# Patient Record
Sex: FEMALE | Race: WHITE | NOT HISPANIC OR LATINO | Employment: FULL TIME | URBAN - NONMETROPOLITAN AREA
[De-identification: names, ages, dates, MRNs, and addresses within clinical notes are randomized per-mention and may not be internally consistent; named-entity substitution may affect disease eponyms.]

---

## 2023-09-01 ENCOUNTER — OFFICE VISIT (OUTPATIENT)
Dept: URGENT CARE | Facility: CLINIC | Age: 43
End: 2023-09-01
Payer: COMMERCIAL

## 2023-09-01 VITALS
SYSTOLIC BLOOD PRESSURE: 142 MMHG | TEMPERATURE: 97.8 F | HEART RATE: 89 BPM | DIASTOLIC BLOOD PRESSURE: 96 MMHG | OXYGEN SATURATION: 94 % | RESPIRATION RATE: 18 BRPM | WEIGHT: 248 LBS

## 2023-09-01 DIAGNOSIS — J02.9 SORE THROAT: Primary | ICD-10-CM

## 2023-09-01 DIAGNOSIS — R53.83 OTHER FATIGUE: ICD-10-CM

## 2023-09-01 DIAGNOSIS — R05.1 ACUTE COUGH: ICD-10-CM

## 2023-09-01 DIAGNOSIS — I88.9 SUBMANDIBULAR LYMPHADENITIS: ICD-10-CM

## 2023-09-01 LAB — S PYO AG THROAT QL: NEGATIVE

## 2023-09-01 PROCEDURE — 87880 STREP A ASSAY W/OPTIC: CPT

## 2023-09-01 PROCEDURE — G0382 LEV 3 HOSP TYPE B ED VISIT: HCPCS

## 2023-09-01 PROCEDURE — 87070 CULTURE OTHR SPECIMN AEROBIC: CPT

## 2023-09-01 RX ORDER — ASCORBIC ACID 500 MG
500 TABLET ORAL DAILY
COMMUNITY

## 2023-09-01 RX ORDER — PREDNISOLONE SODIUM PHOSPHATE 15 MG/5ML
50 SOLUTION ORAL DAILY
Qty: 83.5 ML | Refills: 0 | Status: SHIPPED | OUTPATIENT
Start: 2023-09-01 | End: 2023-09-06

## 2023-09-01 RX ORDER — AMOXICILLIN 400 MG/5ML
875 POWDER, FOR SUSPENSION ORAL 2 TIMES DAILY
Qty: 152.6 ML | Refills: 0 | Status: SHIPPED | OUTPATIENT
Start: 2023-09-01 | End: 2023-09-08

## 2023-09-01 RX ORDER — MONTELUKAST SODIUM 10 MG/1
10 TABLET ORAL
COMMUNITY
Start: 2023-07-17

## 2023-09-01 RX ORDER — ALBUTEROL SULFATE 90 UG/1
2 AEROSOL, METERED RESPIRATORY (INHALATION) EVERY 6 HOURS PRN
COMMUNITY

## 2023-09-01 NOTE — PROGRESS NOTES
[facility]        NAME: Sae Buchanan is a 40 y.o. female  : 1980    MRN: [id number]  DATE: 2023  TIME: 8:00 PM    Assessment and Plan   Sore throat [J02.9]  1. Sore throat  POCT rapid strepA    Throat culture      2. Acute cough  amoxicillin (AMOXIL) 400 MG/5ML suspension    prednisoLONE (ORAPRED) 15 mg/5 mL oral solution      3. Other fatigue        4. Submandibular lymphadenitis  amoxicillin (AMOXIL) 400 MG/5ML suspension    prednisoLONE (ORAPRED) 15 mg/5 mL oral solution        3+ tonsils bilaterally with mild erythema. PT states that she has chronic tonsillar hypertrophy. Rapid strep negative, sending for throat culture. Starting on amoxicillin and prednisone. PT requested suspension for both. Given advice for home remedies. Advised follow-up family doctor. Advised to go the ER symptoms worsen. Patient Instructions     Take prescribed medication as instructed. Take antibiotic with food avoid upset stomach. Daily probiotic supplement. Yogurt with active and live cultures. Tylenol or ibuprofen for pain or fever. Avoid ibuprofen while taking steroids. May take ibuprofen after completion of steroids. Continue with previously prescribed Tessalon Perles for cough. May try warm tea with honey, teaspoon of honey, throat lozenges, will warm salt gargles for sore throat symptoms. Continue with inhalers as previously prescribed. If no improvement in symptoms, follow-up with your family doctor or ENT. If symptoms worsen, go to the emergency room. Follow up with PCP in 3-5 days. Proceed to  ER if symptoms worsen. Chief Complaint     Chief Complaint   Patient presents with   • Fatigue   • Cough   • Headache   • Sore Throat     Onset of symptoms 5 days ago worse today tried otc meds without relief          History of Present Illness       60-year-old female presents with 5 days of fatigue, cough, sore throat, headache that began 5 days ago.   PT had a negative COVID test on the first day of symptoms. PT states she has pain with swallowing and tenderness under her jaw/neck area. PT has tried some over-the-counter medications without significant relief. PT was not diagnosed with asthma, however has albuterol and Singulair inhalers at home. She has not been using these more than usual and does not feel short of breath. PT took left over [employer] with relief of cough. PT states that her cough has been more dry than initially. Admits to slight fever at the beginning of onset but denies any recently. Denies any earache, chest pain, shortness of breath, abdominal pain, nausea, vomiting, diarrhea. History of chronic tonsillar enlargement. Has seen ENT in the past.  PT is concerned today because she has strep throat and COVID last year that required hospitalization. Review of Systems   Review of Systems   Constitutional: Positive for fatigue and fever. HENT: Positive for sore throat. Negative for congestion, ear discharge, ear pain, postnasal drip, rhinorrhea, sinus pressure and sinus pain. Eyes: Negative. Respiratory: Positive for cough. Cardiovascular: Negative. Gastrointestinal: Negative. Genitourinary: Negative. Musculoskeletal: Negative. Skin: Negative. Neurological: Positive for headaches.          Current Medications       Current Outpatient Medications:   •  albuterol (PROVENTIL HFA,VENTOLIN HFA) 90 mcg/act inhaler, Inhale 2 puffs every 6 (six) hours as needed for wheezing, Disp: , Rfl:   •  amoxicillin (AMOXIL) 400 MG/5ML suspension, Take 10.9 mL (875 mg total) by mouth 2 (two) times a day for 7 days, Disp: 152.6 mL, Rfl: 0  •  montelukast (SINGULAIR) 10 mg tablet, Take 10 mg by mouth, Disp: , Rfl:   •  prednisoLONE (ORAPRED) 15 mg/5 mL oral solution, Take 16.7 mL (50 mg total) by mouth daily for 5 days, Disp: 83.5 mL, Rfl: 0  •  ascorbic acid (VITAMIN C) 500 MG tablet, Take 500 mg by mouth daily, Disp: , Rfl:   •  cyanocobalamin (VITAMIN B-12) 1000 MCG tablet, Take 1,000 mcg by mouth daily, Disp: , Rfl:     Current Allergies     Allergies as of 09/01/2023   • (No Known Allergies)            The following portions of the patient's history were reviewed and updated as appropriate: allergies, current medications, past family history, past medical history, past social history, past surgical history and problem list.     Past Medical History:   Diagnosis Date   • Asthma        No past surgical history on file. No family history on file. Medications have been verified. Objective   /96   Pulse 89   Temp 97.8 °F (36.6 °C)   Resp 18   Wt 112 kg (248 lb)   SpO2 94%        Physical Exam     Physical Exam  Constitutional:       General: She is not in acute distress. Appearance: Normal appearance. She is not ill-appearing. HENT:      Head: Normocephalic and atraumatic. Right Ear: Tympanic membrane, ear canal and external ear normal.      Left Ear: Tympanic membrane, ear canal and external ear normal.      Nose: Nose normal.      Mouth/Throat:      Lips: Pink. Mouth: Mucous membranes are moist.      Pharynx: Oropharynx is clear. Uvula midline. Posterior oropharyngeal erythema present. No pharyngeal swelling, oropharyngeal exudate or uvula swelling. Tonsils: No tonsillar exudate or tonsillar abscesses. 3+ on the right. 3+ on the left. Comments: Chronic tonsillar enlargement bilaterally per pt  Eyes:      Extraocular Movements: Extraocular movements intact. Conjunctiva/sclera: Conjunctivae normal.      Pupils: Pupils are equal, round, and reactive to light. Cardiovascular:      Rate and Rhythm: Normal rate and regular rhythm. Pulses: Normal pulses. Heart sounds: Normal heart sounds. Pulmonary:      Effort: Pulmonary effort is normal. No respiratory distress. Breath sounds: Normal breath sounds. No stridor. No wheezing, rhonchi or rales. Chest:      Chest wall: No tenderness. Musculoskeletal:      Cervical back: Normal range of motion and neck supple. No tenderness. Lymphadenopathy:      Cervical: Cervical adenopathy (Submandibular bilaterally with tenderness.) present. Skin:     General: Skin is warm and dry. Capillary Refill: Capillary refill takes less than 2 seconds. Findings: No rash. Neurological:      General: No focal deficit present. Mental Status: She is alert and oriented to person, place, and time. Mental status is at baseline.    Psychiatric:         Mood and Affect: Mood normal.         Behavior: Behavior normal.

## 2023-09-01 NOTE — PATIENT INSTRUCTIONS
Take prescribed medication as instructed. Take antibiotic with food avoid upset stomach. Daily probiotic supplement. Yogurt with active and live cultures. Tylenol or ibuprofen for pain or fever. Avoid ibuprofen while taking steroids. May take ibuprofen after completion of steroids. Continue with previously prescribed Tessalon Perles for cough. May try warm tea with honey, teaspoon of honey, throat lozenges, will warm salt gargles for sore throat symptoms. Continue with inhalers as previously prescribed. If no improvement in symptoms, follow-up with your family doctor or ENT. If symptoms worsen, go to the emergency room. Follow up with PCP in 3-5 days. Proceed to  ER if symptoms worsen. Fatigue   WHAT YOU NEED TO KNOW:   Fatigue is mental and physical exhaustion that does not get better with rest. Fatigue may make daily activities difficult or cause extreme sleepiness. It is normal to feel tired sometimes, but long-term fatigue may be a sign of serious illness. DISCHARGE INSTRUCTIONS:   Return to the emergency department if:   You have chest pain. You have difficulty breathing. Contact your healthcare provider if:   You have a cough that gets worse, or does not go away. You see blood in your urine or bowel movement. You have numbness or tingling around your mouth or in an arm or leg. You faint, feel dizzy, or have vision changes. You have swelling in your lymph nodes. You are a woman and have vaginal bleeding that is not normal for you, or is not expected. You lose weight without trying, or you have trouble eating. You feel weak or have muscle pain. You have pain or swelling in your joints. You have questions or concerns about your condition or care. Follow up with your healthcare provider as directed: You may need more tests.  Your healthcare provider may also refer you to a specialist. Write down your questions so you remember to ask them during your visits. Manage fatigue:   Keep a fatigue diary. Include anything that makes you feel more tired or less tired. Bring the diary with you to follow-up visits with your provider. Exercise as directed. Exercise can help you feel more alert. Exercise can also help you manage stress or relieve depression. Try to get at least 30 minutes of exercise most days of the week. Keep a regular sleep schedule. Go to bed and wake up at the same times every day. Limit naps to 1 hour each day. A nap can improve fatigue, but a long nap may make it harder to go to sleep at night. Plan and limit your activities. Limit the number of activities such as shopping and cleaning you do each day. If possible, try to spread out your trips throughout the week. Plan ahead so you are not rushing to get something done. Only do activities that you have the energy to complete. Take breaks between activities. Ask for help if you need it. Another person may be able to drive you or help with daily activities. Eat a variety of healthy foods. Healthy foods include fruits, vegetables, whole-grain breads, low-fat dairy products, beans, lean meats, and fish. Good nutrition can help manage fatigue. Limit caffeine and alcohol. These can make it difficult to fall or stay asleep. Women should limit alcohol to 1 drink a day. Men should limit alcohol to 2 drinks a day. A drink of alcohol is 12 ounces of beer, 5 ounces of wine, or 1½ ounces of liquor. Ask our healthcare provider how much caffeine is safe for you. Do not smoke. Nicotine and other chemicals in cigarettes and cigars can cause lung damage and increase fatigue. Ask your healthcare provider for information if you currently smoke and need help to quit. E-cigarettes or smokeless tobacco still contain nicotine. Talk to your healthcare provider before you use these products.     © Copyright [employer] 2022 Information is for End User's use only and may not be sold, redistributed or otherwise used for commercial purposes. The above information is an  only. It is not intended as medical advice for individual conditions or treatments. Talk to your doctor, nurse or pharmacist before following any medical regimen to see if it is safe and effective for you. Adenitis   WHAT YOU NEED TO KNOW:   Adenitis is a condition that causes your lymph nodes to become swollen and tender You may also have a fever. Adenitis is a sign of infection usually caused by bacteria. DISCHARGE INSTRUCTIONS:   Call your local emergency number (911 in the [address]) if:   You have trouble breathing or swallowing. Return to the emergency department if:   You have new or worsening redness or swelling. You develop a large, soft bump that may leak pus. Call your doctor if:   Your symptoms do not improve after 10 days of treatment. You have questions or concerns about your condition or care. Medicines: You may  need any of the following:  Antibiotics  help treat a bacterial infection. Acetaminophen  decreases pain and fever. It is available without a doctor's order. Ask how much to take and how often to take it. Follow directions. Read the labels of all other medicines you are using to see if they also contain acetaminophen, or ask your doctor or pharmacist. Acetaminophen can cause liver damage if not taken correctly. NSAIDs , such as ibuprofen, help decrease swelling, pain, and fever. NSAIDs can cause stomach bleeding or kidney problems in certain people. If you take blood thinner medicine, always ask your healthcare provider if NSAIDs are safe for you. Always read the medicine label and follow directions. Take your medicine as directed. Contact your healthcare provider if you think your medicine is not helping or if you have side effects. Tell your provider if you are allergic to any medicine. Keep a list of the medicines, vitamins, and herbs you take.  Include the amounts, and when and why you take them. Bring the list or the pill bottles to follow-up visits. Carry your medicine list with you in case of an emergency. Manage your symptoms:   Apply moist heat  on your swollen lymph nodes for 20 to 30 minutes every 2 hours or as directed. Heat helps decrease pain and swelling. You can make a moist heat pack by soaking a small towel in hot water. Let it cool until you can hold it with your bare hands. Then wring out the extra water. Place the towel in a plastic bag, and wrap the bag with a dry towel around the bag. Place the pack over your swollen lymph nodes. Elevate your head and upper back. Keep your head and upper back elevated when you rest, such as in a recliner. Place extra pillows under your head and neck when you sleep in bed. Elevation helps decrease swelling. Prevent an infection:       Wash your hands often. Wash your hands several times each day. Wash after you use the bathroom, change a child's diaper, and before you prepare or eat food. Use soap and water every time. Rub your soapy hands together, lacing your fingers. Wash the front and back of your hands, and in between your fingers. Use the fingers of one hand to scrub under the fingernails of the other hand. Wash for at least 20 seconds. Rinse with warm, running water for several seconds. Then dry your hands with a clean towel or paper towel. Use hand  that contains alcohol if soap and water are not available. Do not touch your eyes, nose, or mouth without washing your hands first.         Cover a sneeze or cough. Use a tissue that covers your mouth and nose. Throw the tissue away in a trash can right away. Use the bend of your arm if a tissue is not available. Wash your hands well with soap and water or use a hand . Clean surfaces often. Clean doorknobs, countertops, cell phones, and other surfaces that are touched often.  Use a disinfecting wipe, a single-use sponge, or a cloth you can wash and reuse. Use disinfecting  if you do not have wipes. You can create a disinfecting  by mixing 1 part bleach with 10 parts water. Ask about vaccines you may need. Vaccines help prevent diseases caused by some viruses and bacteria. Get the influenza (flu) vaccine as soon as recommended each year. The flu vaccine is usually available starting in September or October. Flu viruses change, so it is important to get a flu vaccine every year. Get the pneumonia vaccine if recommended. This vaccine is usually recommended every 5 years. Your provider will tell you when to get this vaccine, if needed. He or she can tell you if you should get other vaccines, and when to get them. Follow up with your doctor within 2 days: You may be referred to a dentist or need more tests. Write down your questions so you remember to ask them during your visits. © Copyright [employer] 2022 Information is for End User's use only and may not be sold, redistributed or otherwise used for commercial purposes. The above information is an  only. It is not intended as medical advice for individual conditions or treatments. Talk to your doctor, nurse or pharmacist before following any medical regimen to see if it is safe and effective for you. Acute Cough   WHAT YOU NEED TO KNOW:   An acute cough can last up to 3 weeks. Common causes of an acute cough include a cold, allergies, or a lung infection. DISCHARGE INSTRUCTIONS:   Return to the emergency department if:   You have trouble breathing or feel short of breath. You cough up blood, or you see blood in your mucus. You faint or feel weak or dizzy. You have chest pain when you cough or take a deep breath. You have new wheezing. Contact your healthcare provider if:   You have a fever. Your cough lasts longer than 4 weeks. Your symptoms do not improve with treatment.     You have questions or concerns about your condition or care.    Medicines:   Medicines  may be needed to stop the cough, decrease swelling in your airways, or help open your airways. Medicine may also be given to help you cough up mucus. Ask your healthcare provider what over-the-counter medicines you can take. If you have an infection caused by bacteria, you may need antibiotics. Take your medicine as directed. Contact your healthcare provider if you think your medicine is not helping or if you have side effects. Tell your provider if you are allergic to any medicine. Keep a list of the medicines, vitamins, and herbs you take. Include the amounts, and when and why you take them. Bring the list or the pill bottles to follow-up visits. Carry your medicine list with you in case of an emergency. Manage your symptoms:   Do not smoke and stay away from others who smoke. Nicotine and other chemicals in cigarettes and cigars can cause lung damage and make your cough worse. Ask your healthcare provider for information if you currently smoke and need help to quit. E-cigarettes or smokeless tobacco still contain nicotine. Talk to your healthcare provider before you use these products. Drink extra liquids as directed. Liquids will help thin and loosen mucus so you can cough it up. Liquids will also help prevent dehydration. Examples of good liquids to drink include water, fruit juice, and broth. Do not drink liquids that contain caffeine. Caffeine can increase your risk for dehydration. Ask your healthcare provider how much liquid to drink each day. Rest as directed. Do not do activities that make your cough worse, such as exercise. Use a humidifier or vaporizer. Use a cool mist humidifier or a vaporizer to increase air moisture in your home. This may make it easier for you to breathe and help decrease your cough. Eat 2 to 5 mL of honey 2 times each day. Honey can help thin mucus and decrease your cough. Use cough drops or lozenges.   These can help decrease throat irritation and your cough. Follow up with your healthcare provider as directed:  Write down your questions so you remember to ask them during your visits. © Copyright [employer] 2022 Information is for End User's use only and may not be sold, redistributed or otherwise used for commercial purposes. The above information is an  only. It is not intended as medical advice for individual conditions or treatments. Talk to your doctor, nurse or pharmacist before following any medical regimen to see if it is safe and effective for you.

## 2023-09-02 LAB — BACTERIA THROAT CULT: NORMAL

## 2023-09-04 LAB — BACTERIA THROAT CULT: NORMAL

## 2024-11-03 ENCOUNTER — OFFICE VISIT (OUTPATIENT)
Dept: URGENT CARE | Facility: CLINIC | Age: 44
End: 2024-11-03
Payer: COMMERCIAL

## 2024-11-03 ENCOUNTER — APPOINTMENT (OUTPATIENT)
Dept: RADIOLOGY | Facility: CLINIC | Age: 44
End: 2024-11-03
Payer: COMMERCIAL

## 2024-11-03 VITALS
SYSTOLIC BLOOD PRESSURE: 108 MMHG | BODY MASS INDEX: 45.08 KG/M2 | HEART RATE: 97 BPM | HEIGHT: 62 IN | DIASTOLIC BLOOD PRESSURE: 68 MMHG | RESPIRATION RATE: 20 BRPM | WEIGHT: 245 LBS | TEMPERATURE: 97.8 F | OXYGEN SATURATION: 93 %

## 2024-11-03 DIAGNOSIS — J18.9 PNEUMONIA OF RIGHT LUNG DUE TO INFECTIOUS ORGANISM, UNSPECIFIED PART OF LUNG: Primary | ICD-10-CM

## 2024-11-03 DIAGNOSIS — R09.89 CHEST CONGESTION: ICD-10-CM

## 2024-11-03 PROCEDURE — 71046 X-RAY EXAM CHEST 2 VIEWS: CPT

## 2024-11-03 PROCEDURE — G0382 LEV 3 HOSP TYPE B ED VISIT: HCPCS

## 2024-11-03 RX ORDER — DESLORATADINE 5 MG/1
5 TABLET ORAL DAILY
COMMUNITY
Start: 2024-10-17

## 2024-11-03 RX ORDER — FAMOTIDINE 20 MG/1
20 TABLET, FILM COATED ORAL 2 TIMES DAILY
COMMUNITY
Start: 2024-08-29 | End: 2024-11-27

## 2024-11-03 RX ORDER — AMBRISENTAN 10 MG/1
TABLET, FILM COATED ORAL
COMMUNITY
Start: 2024-11-02

## 2024-11-03 RX ORDER — PREDNISONE 20 MG/1
40 TABLET ORAL DAILY
Qty: 10 TABLET | Refills: 0 | Status: SHIPPED | OUTPATIENT
Start: 2024-11-03 | End: 2024-11-08

## 2024-11-03 RX ORDER — NORETHINDRONE 0.35 MG/1
1 TABLET ORAL DAILY
COMMUNITY
Start: 2024-08-16

## 2024-11-03 RX ORDER — AZITHROMYCIN 250 MG/1
TABLET, FILM COATED ORAL
Qty: 6 TABLET | Refills: 0 | Status: SHIPPED | OUTPATIENT
Start: 2024-11-03 | End: 2024-11-07

## 2024-11-03 RX ORDER — SILDENAFIL CITRATE 20 MG/1
TABLET ORAL
COMMUNITY
Start: 2024-09-06

## 2024-11-03 RX ORDER — SELEXIPAG 800 UG/1
TABLET, COATED ORAL
COMMUNITY
Start: 2024-10-17

## 2024-11-03 RX ORDER — LANOLIN ALCOHOL/MO/W.PET/CERES
CREAM (GRAM) TOPICAL
COMMUNITY

## 2024-11-03 NOTE — PROGRESS NOTES
[facility]        NAME: Beatrice Garcia is a 43 y.o. female  : 1980    MRN: [id number]  DATE: November 3, 2024  TIME: 11:21 AM    Assessment and Plan   Chest congestion [R09.89]  1. Chest congestion  XR chest pa and lateral        Provider Radiology Interpretation (preliminary)   Final results will be as per official Radiology Report when available:   CHEST: right middle lobe infiltrate suspected         Patient Instructions       Follow up with PCP in 3-5 days.  Proceed to  ER if symptoms worsen.    If tests are performed, our office will contact you with results only if changes need to made to the care plan discussed with you at the visit. [facility]    Chief Complaint     Chief Complaint   Patient presents with    Cough     Patient states that since yesterday felt chest rumbling and coughing out clear mucus states she had pneumonia in the past and end up in ICU she would like to get check up     Cold Like Symptoms         History of Present Illness       43-year-old female patient presents to this clinic with complaint of cough and chest congestion.  Patient reports she had pneumonia in the past and would like to be checked and make sure she is not starting with that again.  She denies fever or chills.  Denies activity or appetite disturbance.  Patient presented to this clinic wearing nasal cannula oxygen continuously.  She has a medical history of asthma and allergies.    Cough        Review of Systems   Review of Systems   Respiratory:  Positive for cough.         Chest congestion         Current Medications       Current Outpatient Medications:     albuterol (PROVENTIL HFA,VENTOLIN HFA) 90 mcg/act inhaler, Inhale 2 puffs every 6 (six) hours as needed for wheezing, Disp: , Rfl:     ambrisentan (LETAIRIS) 10 MG tablet, , Disp: , Rfl:     desloratadine (CLARINEX) 5 MG tablet, Take 5 mg by mouth daily, Disp: , Rfl:     famotidine (PEPCID) 20 mg  "tablet, Take 20 mg by mouth 2 (two) times a day, Disp: , Rfl:     Jencycla 0.35 MG tablet, Take 1 tablet by mouth in the morning, Disp: , Rfl:     melatonin 3 mg, Take by mouth, Disp: , Rfl:     sildenafil (REVATIO) 20 mg tablet, TAKE 2 TABLET BY MOUTH 3 TIMES A DAY, Disp: , Rfl:     Uptravi 800 MCG TABS, , Disp: , Rfl:     ascorbic acid (VITAMIN C) 500 MG tablet, Take 500 mg by mouth daily (Patient not taking: Reported on 11/3/2024), Disp: , Rfl:     cyanocobalamin (VITAMIN B-12) 1000 MCG tablet, Take 1,000 mcg by mouth daily (Patient not taking: Reported on 11/3/2024), Disp: , Rfl:     montelukast (SINGULAIR) 10 mg tablet, Take 10 mg by mouth (Patient not taking: Reported on 11/3/2024), Disp: , Rfl:     Current Allergies     Allergies as of 11/03/2024 - Reviewed 11/03/2024   Allergen Reaction Noted    Vaccinium angustifolium Hives 06/22/2022    Nitroglycerin Hives 10/15/2024            The following portions of the patient's history were reviewed and updated as appropriate: allergies, current medications, past family history, past medical history, past social history, past surgical history and problem list.     Past Medical History:   Diagnosis Date    Asthma        Past Surgical History:   Procedure Laterality Date    CARDIAC OTHER         History reviewed. No pertinent family history.      Medications have been verified.        Objective   /68 (BP Location: Left arm, Patient Position: Sitting, Cuff Size: Adult)   Pulse 97   Temp 97.8 °F (36.6 °C)   Resp 20   Ht 5' 2\" (1.575 m)   Wt 111 kg (245 lb)   SpO2 93%   BMI 44.81 kg/m²        Physical Exam     Physical Exam  Vitals and nursing note reviewed.   Constitutional:       Appearance: Normal appearance.   HENT:      Head: Normocephalic and atraumatic.   Cardiovascular:      Rate and Rhythm: Normal rate and regular rhythm.      Pulses: Normal pulses.      Heart sounds: Normal heart sounds.   Pulmonary:      Effort: Pulmonary effort is normal.      " Breath sounds: Normal breath sounds.   Skin:     General: Skin is warm and dry.      Capillary Refill: Capillary refill takes less than 2 seconds.   Neurological:      Mental Status: She is alert.

## 2024-11-03 NOTE — PATIENT INSTRUCTIONS
You may take over the counter Tylenol (Acetaminophen) and/or Motrin (Ibuprofen) as needed, as directed on packaging.     Take the medicines prescribed daily for the duration of the time ordered     If symptoms worsen you will need to be rechecked asap. Advise recheck with ER if worse    If symptoms are not improving by 3-5 days advise a recheck.